# Patient Record
Sex: FEMALE | Race: WHITE | Employment: OTHER | URBAN - METROPOLITAN AREA
[De-identification: names, ages, dates, MRNs, and addresses within clinical notes are randomized per-mention and may not be internally consistent; named-entity substitution may affect disease eponyms.]

---

## 2017-12-01 RX ORDER — HYDROCHLOROTHIAZIDE 25 MG/1
TABLET ORAL
Qty: 90 TABLET | Refills: 3 | Status: SHIPPED | OUTPATIENT
Start: 2017-12-01 | End: 2019-04-04 | Stop reason: SDUPTHER

## 2018-08-13 PROBLEM — M89.9 DISEASE OF SKELETAL SYSTEM: Status: ACTIVE | Noted: 2018-08-13

## 2018-09-10 ENCOUNTER — OFFICE VISIT (OUTPATIENT)
Age: 53
End: 2018-09-10

## 2018-09-10 ENCOUNTER — PROCEDURE VISIT (OUTPATIENT)
Age: 53
End: 2018-09-10

## 2018-09-10 ENCOUNTER — HOSPITAL ENCOUNTER (OUTPATIENT)
Dept: GENERAL RADIOLOGY | Age: 53
Discharge: HOME | End: 2018-09-10
Payer: COMMERCIAL

## 2018-09-10 VITALS
WEIGHT: 131.6 LBS | DIASTOLIC BLOOD PRESSURE: 56 MMHG | SYSTOLIC BLOOD PRESSURE: 93 MMHG | BODY MASS INDEX: 23.32 KG/M2 | HEIGHT: 63 IN

## 2018-09-10 DIAGNOSIS — M89.9 DISEASE OF SKELETAL SYSTEM: ICD-10-CM

## 2018-09-10 DIAGNOSIS — M89.9 DISORDER OF BONE AND CARTILAGE: Primary | ICD-10-CM

## 2018-09-10 DIAGNOSIS — R82.994 HYPERCALCIURIA: ICD-10-CM

## 2018-09-10 DIAGNOSIS — M94.9 DISORDER OF BONE AND CARTILAGE: Primary | ICD-10-CM

## 2018-09-10 DIAGNOSIS — Z51.81 MEDICATION MONITORING ENCOUNTER: ICD-10-CM

## 2018-09-10 DIAGNOSIS — M89.9 DISEASE OF SKELETAL SYSTEM: Primary | ICD-10-CM

## 2018-09-10 LAB — THYROID PEROXIDASE (TPO) ABS: 7 IU/ML

## 2018-09-10 PROCEDURE — 77080 DXA BONE DENSITY AXIAL: CPT

## 2018-09-10 PROCEDURE — 77080 DXA BONE DENSITY AXIAL: CPT | Performed by: INTERNAL MEDICINE

## 2018-09-10 PROCEDURE — 99215 OFFICE O/P EST HI 40 MIN: CPT | Performed by: INTERNAL MEDICINE

## 2018-09-10 RX ORDER — M-VIT,TX,IRON,MINS/CALC/FOLIC 27MG-0.4MG
1 TABLET ORAL DAILY
COMMUNITY

## 2018-09-10 RX ORDER — CHOLECALCIFEROL (VITAMIN D3) 125 MCG
500 CAPSULE ORAL DAILY
COMMUNITY

## 2018-09-10 RX ORDER — LANOLIN ALCOHOL/MO/W.PET/CERES
3 CREAM (GRAM) TOPICAL DAILY
COMMUNITY

## 2018-09-10 NOTE — PROGRESS NOTES
[facility] ([facility]) Osteoporosis and [address] [facility]  Phone [phone]  Fax [phone]    PATIENT NAME: Chidi Chow OF BIRTH: 1965  INITIAL CONSULTATION: 11/24/2004  LAST OFFICE VISIT: 09/07/2016  TODAYS DATE: 09/10/2018    Labs @ [facility] ([facility]) 04/2018    PROBLEMS:   Low bone density by DXA done 05//2001 to evaluate back pain, lowest T-score -1.8 in the spine  Natural menopause age 44  Hypercalciuria, normal 24-h urine calcium for her 100-230 mg/d    398 mg/d 12/2004 on no treatment    220 mg/d 05/2006 with HCTZ 25 mg daily  NEW PROBLEM 2012: Hypothyroidism, Synthroid [id number], stopped on her own. CURRENT MANAGEMENT FOR BONE HEALTH:   Calcium, had been getting 1200 mg/d from diet but changed to vegan diet so may be short    Multivitamin 45 mg Ca 400 IU D, Caltrate 600 mg Ca 800 IU D  Vitamin D    34 ng/mL 06/2014  Exercise, walked 5-6x/wk until foot problems 05/2008; exercise bike 1-2x/wk, lifts weights 3x/wk  Pharmacologic therapy: HCTZ 25 mg daily started 12/2004    PREVIOUS MEDICATIONS FOR OSTEOPOROSIS:   Bone therapy recommended elsewhere in 2004 but not started    OTHER CURRENT MEDICATIONS (SELECTED):  Relafen, Synthroid 50 mcg/d  OTC MEDICATIONS:  None    CHIEF COMPLAINT: Here for followup visit for osteopenia and hypercalciuria, monitoring treatment. No new related signs or symptoms. PAST MEDICAL HISTORY, FAMILY HISTORY, SOCIAL HISTORY AND REVIEW OF SYSTEMS:  Relevant changes since last visit (see patient questionnaire of todays date). SUBJECTIVE:  See problem list for chronic/inactive conditions. She has been taking HCTZ correctly and without side effects. No falls, near falls or fractures. She feels well overall. In 2012 she was diagnosed with hypothyroidism.   After several dosage changes she has been taking Synthroid 50 mcg daily with normal TSH but decided in 2017 to stop Synthroid; since then she has not noted any difference in how she feels. Her  has cardiac problems so they have both been on a vegan diet. NEUROLOGIC EXAM: Able to rise from chair without using arms. No apparent focal motor or sensory deficit. Reflexes brisk and symmetric. Coordination appears normal.  MUSCULOSKELETAL EXAM: Gait: Intact without difficulty. Steady without assistance. Spine: Spinal contours are normal.  No spine tenderness to palpation or percussion. Ribs and pelvis: Ribs appear normal. Two finger spaces between ribs and pelvis. BONE DENSITY:  Most recent done here using Hologic equipment. T-SCORES  Initial study: 05/31/2001 spine L1-L4 -1.8 Lowest hip (left fem. neck) -0.8   Current study: 09/10/2018 spine L1-L4 -2.3 Lowest hip (left fem. neck) -2.0     The table below shows bone mineral density (grams/cm2), the appropriate measure for comparing serial scans An increase or decrease is significant based on precision studies done at our center according to the ISCD protocol. PA spine Proximal Femur (left)   Date L1-L4 Fem. neck Trochanter Total hip   05/31/2001 0.851 0.708 0.784 0.923   07/08/2002 0.833 0.733 0.821 0.926   07/08/2003 0.789 0.739 0.823 0.897   06/25/2004 0.833 0.717 0.816 0.893   06/12/2006 0.834 0.738 0.853 0.966   06/23/2008 0.843 0.736 0.835 0.961   06/16/2010 0.849 0.744 0.877 0.987   06/19/2012 0.856 0.703 0.848 0.927   06/24/2014 0.876 0.686 0.839 0.908   09/07/2016 0.850 0.682 0.796 0.873   09/10/2018 0.791 0.630 0.764 0.832     IMPRESSION:  BONE DENSITY IS BORDERLINE LOW. SINCE THE LAST DXA, BMD DECREASED AT ALL SITES MEASURED, MORE THAN WOULD BE EXPECTED FOR AGE, RACE AND SEX. LABS. 05/2012, Cr 0.8, Ca 9.3 TSH. 05/2014, CBC, CMP. 04/2016 CBC TSH Ca 9.8 Cr 0.8 Na K OK.  04/2018 Ca 10.0 Cr 0.8 TSH 5.04. IMAGING. DXA printouts reviewed. ASSESSMENT:  Premenopausal woman with bone density borderline low, due at least in part to hypercalciuria which is now controlled.

## 2018-09-12 ENCOUNTER — TELEPHONE (OUTPATIENT)
Age: 53
End: 2018-09-12

## 2020-06-29 ENCOUNTER — OFFICE VISIT (OUTPATIENT)
Dept: ENDOCRINOLOGY | Age: 55
End: 2020-06-29
Payer: COMMERCIAL

## 2020-06-29 ENCOUNTER — HOSPITAL ENCOUNTER (OUTPATIENT)
Dept: GENERAL RADIOLOGY | Age: 55
Discharge: HOME | End: 2020-06-29
Payer: COMMERCIAL

## 2020-06-29 ENCOUNTER — PROCEDURE VISIT (OUTPATIENT)
Dept: ENDOCRINOLOGY | Age: 55
End: 2020-06-29

## 2020-06-29 VITALS
WEIGHT: 128 LBS | SYSTOLIC BLOOD PRESSURE: 106 MMHG | DIASTOLIC BLOOD PRESSURE: 71 MMHG | BODY MASS INDEX: 22.68 KG/M2 | HEIGHT: 63 IN

## 2020-06-29 PROCEDURE — 77080 DXA BONE DENSITY AXIAL: CPT | Performed by: INTERNAL MEDICINE

## 2020-06-29 PROCEDURE — 77080 DXA BONE DENSITY AXIAL: CPT

## 2020-06-29 PROCEDURE — 99215 OFFICE O/P EST HI 40 MIN: CPT | Performed by: INTERNAL MEDICINE

## 2020-06-29 RX ORDER — HYDROCHLOROTHIAZIDE 25 MG/1
TABLET ORAL
Qty: 90 TABLET | Refills: 4 | Status: SHIPPED | OUTPATIENT
Start: 2020-06-29 | End: 2021-07-06 | Stop reason: SDUPTHER

## 2020-06-29 RX ORDER — ALENDRONATE SODIUM 70 MG/1
70 TABLET ORAL WEEKLY
Qty: 12 TABLET | Refills: 4 | Status: SHIPPED | OUTPATIENT
Start: 2020-06-29 | End: 2021-07-06 | Stop reason: SDUPTHER

## 2020-06-29 NOTE — PROGRESS NOTES
[facility] ([facility]) Osteoporosis and [address] [facility] [address] [facility]  Phone [phone]  Fax [phone]    PATIENT NAME: Ana Laura Hamlin OF BIRTH: 1965  INITIAL CONSULTATION: 11/24/2004  LAST OFFICE VISIT: 09/10/2018  TODAYS DATE: 06/29/2020    Labs @ [facility] (05/2020    PROBLEMS:   Low bone density by DXA done 05//2001 to evaluate back pain, lowest T-score -1.8 in the spine  Natural menopause age 44  Hypercalciuria, normal 24-h urine calcium for her 100-230 mg/d    398 mg/d 12/2004 on no treatment    220 mg/d 05/2006 with HCTZ 25 mg daily  NEW PROBLEM 2012: Hypothyroidism, Synthroid [id number], stopped on her own    05/2020 TSH 4.8. 09/2018 TPO antibodies (-)     CURRENT MANAGEMENT FOR BONE HEALTH:   Calcium, had been getting 1200 mg/d from diet but changed to vegan diet so may be short    Multivitamin 45 mg Ca 400 IU D, Caltrate 600 mg Ca 800 IU D  Vitamin D    34 ng/mL 06/2014  Exercise, walked 5-6x/wk until foot problems 05/2008; exercise bike 1-2x/wk, lifts weights 3x/wk  Pharmacologic therapy: HCTZ 25 mg daily started 12/2004    PREVIOUS MEDICATIONS FOR OSTEOPOROSIS:   Bone therapy recommended elsewhere in 2004 but not started    OTHER CURRENT MEDICATIONS (SELECTED):  Relafen, Synthroid 50 mcg/d  OTC MEDICATIONS:  None    CHIEF COMPLAINT: Here for followup visit for osteopenia and hypercalciuria, monitoring treatment. No new related signs or symptoms. PAST MEDICAL HISTORY, FAMILY HISTORY, SOCIAL HISTORY AND REVIEW OF SYSTEMS:  Relevant changes since last visit (see patient questionnaire of todays date). INTERVAL HISTORY:    See problem list for chronic/inactive conditions. She has been taking HCTZ correctly and without side effects. No falls, near falls or fractures. She feels well overall. Zee Damico NEUROLOGIC EXAM: Able to rise from chair without using arms. No apparent focal motor or sensory deficit. Reflexes brisk and symmetric.   Coordination appears normal.  MUSCULOSKELETAL EXAM: Gait: Intact without difficulty. Steady without assistance. Spine: Spinal contours are normal.  No spine tenderness to palpation or percussion. Ribs and pelvis: Ribs appear normal. Two finger spaces between ribs and pelvis. BONE DENSITY:  Most recent done here using Hologic equipment. T-SCORES  Initial study: 05/31/2001 spine L1-L4 -1.8 Lowest hip (left fem. neck) -0.8   Current study: 06/29/2020 spine L1-L4 -2.8 Lowest hip (left fem. neck) -2.2     The table below shows bone mineral density (grams/cm2), the appropriate measure for comparing serial scans. An increase or decrease is significant based on precision studies done at our center according to the ISCD protocol. PA spine Proximal Femur (left)   Date L1-L4 Fem. neck Trochanter Total hip   05/31/2001 0.851 0.708 0.784 0.923   07/08/2002 0.833 0.733 0.821 0.926   07/08/2003 0.789 0.739 0.823 0.897   06/25/2004 0.833 0.717 0.816 0.893   06/12/2006 0.834 0.738 0.853 0.966   06/23/2008 0.843 0.736 0.835 0.961   06/16/2010 0.849 0.744 0.877 0.987   06/19/2012 0.856 0.703 0.848 0.927   06/24/2014 0.876 0.686 0.839 0.908   09/07/2016 0.850 0.682 0.796 0.873   09/10/2018 0.791 0.630 0.764 0.832   06/29/2020 0.735 0.599 0.775 0.822     IMPRESSION:  BONE DENSITY IS LOW. SINCE THE LAST DXA, BMD DECREASED IN THE SPINE. BETWEEN 2016 AND 2020, BMD DECREASED IN THE SPINE, FEMORAL NECK AND TOTAL HIP. LABS. 05/2012, Cr 0.8, Ca 9.3 TSH. 05/2014, CBC, CMP. 04/2016 CBC TSH Ca 9.8 Cr 0.8 Na K OK.  04/2018 Ca 10.0 Cr 0.8 TSH 5.04. 05/2020 Ca 9.7 Cr 0.8 TSH 4.8. IMAGING. DXA printouts reviewed. ASSESSMENT:  Premenopausal woman with bone density borderline low, due at least in part to hypercalciuria which is now controlled. BMD has been steadily decreasing since 2014. PLANS:  Continue HCTZ 25 mg/d. We discussed treatment options (alendronate, Reclast, Prolia). I recommended alendronate and she agreed.  Rx sent, dosing instructions reviewed. Lab: 25-OH D. Return appointment with DXA in 1 year. I spent 45 minutes face to face with this patient. Over 50% of that time was spent on counseling and care coordination. See assessment and plan for counseling and care coordination details. Michelle Arroyo MD, Director, [facility]) Osteoporosis and Bone Health Services    CC:  Adrián Hendricks MD, [address] [place], [place]

## 2020-06-29 NOTE — RESULT ENCOUNTER NOTE
[facility]) Osteoporosis and [address] [address] [facility] [address] [place] [address]   Phone [phone]  Fax [phone]    NAME: Maria G Laguerre   : 1965   STUDY DATE: 2020     REFERRING PHYSICIAN: Nicola Halsted MD    INDICATION(S) FOR PERFORMING THE STUDY:  osteoporosis, age related (M81.0)    CLINICAL INFORMATION PROVIDED BY THE PATIENT: 66-year-old woman. She is premenopausal. No history of fragility fractures. No long-term corticosteroid use. She is receiving hydrochlorothiazide for hypercalciuria (started 2004)    EQUIPMENT: Hologic Discovery. POSITIONING: Good. REGIONS OF INTEREST: Correct. ARTIFACTS: None. STUDY VALID? Yes. No adjustments were made. T-SCORES  Initial study: 2001 spine L1-L4 -1.8 Lowest hip (left fem. neck) -0.8   Current study: 2020 spine L1-L4 -2.8 Lowest hip (left fem. neck) -2.2     The table below shows bone mineral density (grams/cm2), the appropriate measure for comparing serial scans. An increase or decrease is significant based on precision studies done at our center according to the ISCD protocol. PA spine Proximal Femur (left)   Date L1-L4 Fem. neck Trochanter Total hip   2001 0.851 0.708 0.784 0.923   2002 0.833 0.733 0.821 0.926   2003 0.789 0.739 0.823 0.897   2004 0.833 0.717 0.816 0.893   2006 0.834 0.738 0.853 0.966   2008 0.843 0.736 0.835 0.961   2010 0.849 0.744 0.877 0.987   2012 0.856 0.703 0.848 0.927   2014 0.876 0.686 0.839 0.908   2016 0.850 0.682 0.796 0.873   09/10/2018 0.791 0.630 0.764 0.832   2020 0.735 0.599 0.775 0.822     IMPRESSION:  BONE DENSITY IS LOW. SINCE THE LAST DXA, BMD DECREASE IN THE SPINE. BETWEEN  AND , BMD DECREASED IN THE SPINE, FEMORAL NECK AND TOTAL HIP. Consider repeating this study in 2-3 years to assess the patient's progress. _________________________________________________   Krystin Fulling

## 2021-06-06 PROBLEM — M81.0 POSTMENOPAUSAL OSTEOPOROSIS: Status: ACTIVE | Noted: 2021-06-06

## 2021-07-06 ENCOUNTER — PROCEDURE VISIT (OUTPATIENT)
Dept: ENDOCRINOLOGY | Age: 56
End: 2021-07-06

## 2021-07-06 ENCOUNTER — HOSPITAL ENCOUNTER (OUTPATIENT)
Dept: GENERAL RADIOLOGY | Age: 56
Discharge: HOME | End: 2021-07-06
Payer: COMMERCIAL

## 2021-07-06 ENCOUNTER — OFFICE VISIT (OUTPATIENT)
Dept: ENDOCRINOLOGY | Age: 56
End: 2021-07-06
Payer: COMMERCIAL

## 2021-07-06 VITALS
BODY MASS INDEX: 22.79 KG/M2 | HEIGHT: 63 IN | SYSTOLIC BLOOD PRESSURE: 105 MMHG | WEIGHT: 128.6 LBS | DIASTOLIC BLOOD PRESSURE: 65 MMHG

## 2021-07-06 DIAGNOSIS — M81.0 POSTMENOPAUSAL OSTEOPOROSIS: ICD-10-CM

## 2021-07-06 DIAGNOSIS — R82.994 HYPERCALCIURIA: ICD-10-CM

## 2021-07-06 DIAGNOSIS — M81.0 POSTMENOPAUSAL OSTEOPOROSIS: Primary | ICD-10-CM

## 2021-07-06 DIAGNOSIS — Z51.81 MEDICATION MONITORING ENCOUNTER: ICD-10-CM

## 2021-07-06 PROCEDURE — 77080 DXA BONE DENSITY AXIAL: CPT | Performed by: INTERNAL MEDICINE

## 2021-07-06 PROCEDURE — 99214 OFFICE O/P EST MOD 30 MIN: CPT | Performed by: INTERNAL MEDICINE

## 2021-07-06 PROCEDURE — 77080 DXA BONE DENSITY AXIAL: CPT

## 2021-07-06 RX ORDER — HYDROCHLOROTHIAZIDE 25 MG/1
TABLET ORAL
Qty: 90 TABLET | Refills: 4 | Status: SHIPPED | OUTPATIENT
Start: 2021-07-06 | End: 2022-07-25 | Stop reason: SDUPTHER

## 2021-07-06 RX ORDER — ALENDRONATE SODIUM 70 MG/1
70 TABLET ORAL WEEKLY
Qty: 12 TABLET | Refills: 4 | Status: SHIPPED | OUTPATIENT
Start: 2021-07-06 | End: 2022-07-25 | Stop reason: SDUPTHER

## 2021-07-06 NOTE — RESULT ENCOUNTER NOTE
[facility] ([facility]) Osteoporosis and [address] [facility] [address]  [address], [address] [place] [address],[place] [address]  Phone [phone]  Fax [phone]    NAME: Hortencia Burnette   : 1965   STUDY DATE: 2021     REFERRING PHYSICIAN: Jonah Mckeon MD    INDICATION(S) FOR PERFORMING THE STUDY:  osteoporosis, age related (M81.0)    CLINICAL INFORMATION PROVIDED BY THE PATIENT: 26-year-old woman. She is premenopausal. No history of fragility fractures. No long-term corticosteroid use. She is receiving hydrochlorothiazide for hypercalciuria (started 2004). Current treatment is alendronate started 2020. EQUIPMENT: Hologic Discovery. POSITIONING: Good. REGIONS OF INTEREST: Correct. ARTIFACTS: None. STUDY VALID? Yes. No adjustments were made. T-SCORES  Initial study: 2001 spine L1-L4 -1.8 Lowest hip (left fem. neck) -0.8   Current study: 2021 spine L1-L4 -2.6 Lowest hip (left fem. neck) -2.0     The table below shows bone mineral density (grams/cm2), the appropriate measure for comparing serial scans. An increase or decrease is significant based on precision studies done at our center according to the ISCD protocol. PA spine Proximal Femur (left)   Date L1-L4 Fem. neck Trochanter Total hip   2001 0.851 0.708 0.784 0.923   2002 0.833 0.733 0.821 0.926   2003 0.789 0.739 0.823 0.897   2004 0.833 0.717 0.816 0.893   2008 0.843 0.736 0.835 0.961   2012 0.856 0.703 0.848 0.927   2016 0.850 0.682 0.796 0.873   09/10/2018 0.791 0.630 0.764 0.832   2020 0.735 0.599 0.775 0.822   2021 0.758 0.625 0.788 0.849     IMPRESSION:  BONE DENSITY IS LOW. SINCE THE LAST DXA, BMD IS TRENDING UP IN THE SPINE,, FEMORAL NECK AND TOTAL HIP. Consider repeating this study in 2-3 years to assess the patient's progress. ________________________________________________[facility] Ti Arroyo MD, Director, [facility]) Osteoporosis and [facility]

## 2021-07-06 NOTE — PROGRESS NOTES
[facility]) Osteoporosis and [address] [facility]  Phone [phone]  Fax [phone]    NAME: Sheila Mcmillan OF BIRTH: 1965  INITIAL CONSULTATION: 11/24/2004  LAST OFFICE VISIT: 06/29/2020  TODAYS DATE: 07/06/2021    Labs @ [facility] 04/2021    PROBLEMS:   Low bone density by DXA done 05//2001 to evaluate back pain, lowest T-score -1.8 in the spine  Natural menopause age 44  Hypercalciuria, normal 24-h urine calcium for her 100-230 mg/d    398 mg/d 12/2004 on no treatment    220 mg/d 05/2006 with HCTZ 25 mg daily    242 mg/d 09/2018 with HCTZ 25 mg/d  NEW PROBLEM 2012: Hypothyroidism, Synthroid [id number], stopped on her own    05/2020 TSH 4.8. 09/2018 TPO antibodies (-)     CURRENT MANAGEMENT FOR BONE HEALTH:   Calcium, had been getting 1200 mg/d from diet but changed to vegan diet so may be short    Multivitamin 45 mg Ca 400 IU D, Caltrate 600 mg Ca 800 IU D  Vitamin D 1000 IU/d with multivitamin    25-OH D 31 ng/mL 04/2021 (desirable 30-60)  Exercise, walked 5-6x/wk until foot problems 05/2008; exercise bike 1-2x/wk, lifts weights 3x/wk  Pharmacologic therapy: HCTZ 25 mg daily started 12/2004    Alendronate 70 mg weekly started 06/2020    PREVIOUS MEDICATIONS FOR OSTEOPOROSIS:   Bone therapy recommended elsewhere in 2004 but not started    OTHER CURRENT MEDICATIONS (SELECTED):  Relafen, Synthroid 50 mcg/d  OTC MEDICATIONS:  None    CHIEF COMPLAINT: Here for followup visit for osteopenia and hypercalciuria, monitoring treatment. No new related signs or symptoms. PAST MEDICAL HISTORY, FAMILY HISTORY, SOCIAL HISTORY AND REVIEW OF SYSTEMS:  Relevant changes since last visit (see patient questionnaire of todays date). INTERVAL HISTORY:    See problem list for chronic/inactive conditions. She has been taking HCTZ and alendronate correctly and without side effects. No falls, near falls or fractures. She feels well overall. Gena [place]       NEUROLOGIC EXAM: Able to rise from chair without using arms. No apparent focal motor or sensory deficit. Reflexes brisk and symmetric. Coordination appears normal.  MUSCULOSKELETAL EXAM: Gait: Intact without difficulty. Steady without assistance. Spine: Spinal contours are normal.  No spine tenderness to palpation or percussion. Ribs and pelvis: Ribs appear normal. Two finger spaces between ribs and pelvis. BONE DENSITY:  Most recent done here using Hologic equipment. T-SCORES  Initial study: 05/31/2001 spine L1-L4 -1.8 Lowest hip (left fem. neck) -0.8   Current study: 07/06/2021 spine L1-L4 -2.6 Lowest hip (left fem. neck) -2.0     The table below shows bone mineral density (grams/cm2), the appropriate measure for comparing serial scans. An increase or decrease is significant based on precision studies done at our center according to the ISCD protocol. PA spine Proximal Femur (left)   Date L1-L4 Fem. neck Trochanter Total hip   05/31/2001 0.851 0.708 0.784 0.923   07/08/2002 0.833 0.733 0.821 0.926   07/08/2003 0.789 0.739 0.823 0.897   06/25/2004 0.833 0.717 0.816 0.893   06/23/2008 0.843 0.736 0.835 0.961   06/19/2012 0.856 0.703 0.848 0.927   09/07/2016 0.850 0.682 0.796 0.873   09/10/2018 0.791 0.630 0.764 0.832   06/29/2020 0.735 0.599 0.775 0.822   07/06/2021 0.758 0.625 0.788 0.849     IMPRESSION:  BONE DENSITY IS LOW. SINCE THE LAST DXA, BMD IS TRENDING UP IN THE SPINE, FEMORAL NECK AND TOTAL HIP. LABS. 05/2012, Cr 0.8, Ca 9.3 TSH. 05/2014, CBC, CMP. 04/2016 CBC TSH Ca 9.8 Cr 0.8 Na K OK.  04/2018 Ca 10.0 Cr 0.8 TSH 5.04. 05/2020 Ca 9.7 Cr 0.8 TSH 4.8. 074239 Ca 9.5 Cr 0.8. IMAGING. DXA printouts reviewed. ASSESSMENT:  Premenopausal woman with bone density borderline low, due at least in part to hypercalciuria which is now controlled. BMD steadily decreased [id number]. She is doing well with alendronate started 06/2020. PLANS:  Continue HCTZ 25 mg/d and alendronate 70 mg weekly. Return appointment with DXA in 1 year. Time spent today: 30-40 minutes. Blaze Arroyo MD, Director, [facility]) Osteoporosis and Bone Health Services    CC:  Susan Longoria MD, [address] [place] [address] [place], [place]

## 2022-07-25 ENCOUNTER — OFFICE VISIT (OUTPATIENT)
Dept: ENDOCRINOLOGY | Age: 57
End: 2022-07-25
Payer: COMMERCIAL

## 2022-07-25 ENCOUNTER — PROCEDURE VISIT (OUTPATIENT)
Dept: ENDOCRINOLOGY | Age: 57
End: 2022-07-25

## 2022-07-25 ENCOUNTER — HOSPITAL ENCOUNTER (OUTPATIENT)
Dept: GENERAL RADIOLOGY | Age: 57
Discharge: HOME | End: 2022-07-25
Payer: COMMERCIAL

## 2022-07-25 VITALS
WEIGHT: 128.2 LBS | SYSTOLIC BLOOD PRESSURE: 108 MMHG | HEIGHT: 63 IN | BODY MASS INDEX: 22.71 KG/M2 | DIASTOLIC BLOOD PRESSURE: 66 MMHG

## 2022-07-25 DIAGNOSIS — M81.0 POSTMENOPAUSAL OSTEOPOROSIS: ICD-10-CM

## 2022-07-25 DIAGNOSIS — R82.994 HYPERCALCIURIA: ICD-10-CM

## 2022-07-25 DIAGNOSIS — M81.0 POSTMENOPAUSAL OSTEOPOROSIS: Primary | ICD-10-CM

## 2022-07-25 DIAGNOSIS — Z51.81 MEDICATION MONITORING ENCOUNTER: ICD-10-CM

## 2022-07-25 PROCEDURE — 99214 OFFICE O/P EST MOD 30 MIN: CPT | Performed by: INTERNAL MEDICINE

## 2022-07-25 PROCEDURE — 77080 DXA BONE DENSITY AXIAL: CPT

## 2022-07-25 PROCEDURE — 77080 DXA BONE DENSITY AXIAL: CPT | Performed by: INTERNAL MEDICINE

## 2022-07-25 RX ORDER — ALENDRONATE SODIUM 70 MG/1
70 TABLET ORAL WEEKLY
Qty: 12 TABLET | Refills: 4 | Status: SHIPPED | OUTPATIENT
Start: 2022-07-25

## 2022-07-25 RX ORDER — HYDROCHLOROTHIAZIDE 25 MG/1
TABLET ORAL
Qty: 90 TABLET | Refills: 4 | Status: SHIPPED | OUTPATIENT
Start: 2022-07-25

## 2022-07-25 NOTE — PROGRESS NOTES
[facility]) Osteoporosis and [address] [facility]  Phone [phone]  Fax [phone]    NAME: Meg Chin OF BIRTH: 1965  INITIAL CONSULTATION: 11/24/2004  LAST OFFICE VISIT: 07/06/2021  TODAY'S DATE: 07/25/2022    Labs @ Dr. Ralf Osorio 04/2022    PROBLEMS:   Low bone density by DXA done 05//2001 to evaluate back pain, lowest T-score -1.8 in the spine  Natural menopause age 44  Hypercalciuria, normal 24-h urine calcium for her 100-230 mg/d    398 mg/d 12/2004 on no treatment    220 mg/d 05/2006 with HCTZ 25 mg daily    242 mg/d 09/2018 with HCTZ 25 mg/d  NEW PROBLEM 2012: Hypothyroidism, Synthroid [id number], stopped on her own    05/2020 TSH 4.8. 09/2018 TPO antibodies (-)     CURRENT MANAGEMENT FOR BONE HEALTH:   Calcium, had been getting 1200 mg/d from diet but changed to vegan diet so may be short    Multivitamin 45 mg Ca 400 IU D, Caltrate 600 mg Ca 800 IU D  Vitamin D 1000 IU/d with multivitamin    25-OH D 31 ng/mL 04/2021 (desirable 30-60)  Exercise, walked 5-6x/wk until foot problems 05/2008; exercise bike 1-2x/wk, lifts weights 3x/wk  Pharmacologic therapy: HCTZ 25 mg daily started 12/2004    Alendronate 70 mg weekly started 06/2020    PREVIOUS MEDICATIONS FOR OSTEOPOROSIS:   Bone therapy recommended elsewhere in 2004 but not started    OTHER CURRENT MEDICATIONS (SELECTED):  Relafen, Synthroid 50 mcg/d  OTC MEDICATIONS:  None    CHIEF COMPLAINT: Here for followup visit for osteopenia and hypercalciuria, monitoring treatment. No new related signs or symptoms. PAST MEDICAL HISTORY, FAMILY HISTORY, SOCIAL HISTORY AND REVIEW OF SYSTEMS:  Relevant changes since last visit (see patient questionnaire of today's date). INTERVAL HISTORY:    See problem list for chronic/inactive conditions. She has been taking HCTZ and alendronate correctly and without side effects. No falls, near falls or fractures. She feels well overall. Cristy Cantu HCTZ 25 mg/d and alendronate 70 mg weekly. Return appointment with DXA in 1 year. Time spent today: 30-39 minutes. Francois Arroyo MD, Director, [facility]) Osteoporosis and Bone Health Services    CC:  Edgar Moreno MD, [address] [place] [address] [place], [place]

## 2022-07-25 NOTE — RESULT ENCOUNTER NOTE
[facility]) Osteoporosis and [address] [facility] [address] [facility], [address] [place] [address],[place] [address]  Phone [phone]  Fax [phone]    NAME: Esau Gonzalez   : 1965   STUDY DATE: 2022     REFERRING PHYSICIAN: Doris Parrish MD    INDICATION(S) FOR PERFORMING THE STUDY:  osteoporosis, age related (M81.0)    CLINICAL INFORMATION PROVIDED BY THE PATIENT: 58-year-old woman. She is premenopausal. No history of fragility fractures. No long-term corticosteroid use. She is receiving hydrochlorothiazide for hypercalciuria (started 2004). Current treatment is alendronate started 2020. EQUIPMENT: Hologic Discovery. POSITIONING: Good. REGIONS OF INTEREST: Correct. ARTIFACTS: None. STUDY VALID? Yes. No adjustments were made. T-SCORES  Initial study: 2001 spine L1-L4 -1.8 Lowest hip (left fem. neck) -0.8   Current study: 2022 spine L1-L4 -2.6 Lowest hip (left fem. neck) -2.0     The table below shows bone mineral density (grams/cm2), the appropriate measure for comparing serial scans. An increase or decrease is significant based on precision studies done at our center according to the ISCD protocol. PA spine Proximal Femur (left)   Date L1-L4 Fem. neck Trochanter Total hip   2001 0.851 0.708 0.784 0.923   2002 0.833 0.733 0.821 0.926   2012 0.856 0.703 0.848 0.927   2016 0.850 0.682 0.796 0.873   09/10/2018 0.791 0.630 0.764 0.832   2020 0.735 0.599 0.775 0.822   2021 0.758 0.625 0.788 0.849   2022 0.767 0.600 0.750 0.819     IMPRESSION:  BONE DENSITY IS LOW. SINCE THE LAST DXA, BMD DID NOT CHANGE SIGNIFICANTLY IN THE SPINE OR LEFT HIP.  COMPARED WITH 2020, BEFORE STARTING ALENDRONATE, BMD IS HIGHER NOW IN THE SPINE. Consider repeating this study in 2-3 years to assess the patient's progress. _________________________________________________   Magen Kaveh Arroyo MD, Director, [facility]) Osteoporosis and [facility]

## 2023-08-28 ENCOUNTER — HOSPITAL ENCOUNTER (OUTPATIENT)
Dept: GENERAL RADIOLOGY | Age: 58
Discharge: HOME | End: 2023-08-28
Payer: COMMERCIAL

## 2023-08-28 ENCOUNTER — OFFICE VISIT (OUTPATIENT)
Dept: ENDOCRINOLOGY | Age: 58
End: 2023-08-28

## 2023-08-28 ENCOUNTER — PROCEDURE VISIT (OUTPATIENT)
Dept: ENDOCRINOLOGY | Age: 58
End: 2023-08-28

## 2023-08-28 VITALS
BODY MASS INDEX: 23.81 KG/M2 | SYSTOLIC BLOOD PRESSURE: 100 MMHG | DIASTOLIC BLOOD PRESSURE: 69 MMHG | HEIGHT: 62 IN | WEIGHT: 129.4 LBS

## 2023-08-28 DIAGNOSIS — M81.0 POSTMENOPAUSAL OSTEOPOROSIS: ICD-10-CM

## 2023-08-28 DIAGNOSIS — Z51.81 MEDICATION MONITORING ENCOUNTER: ICD-10-CM

## 2023-08-28 DIAGNOSIS — M81.0 POSTMENOPAUSAL OSTEOPOROSIS: Primary | ICD-10-CM

## 2023-08-28 DIAGNOSIS — R82.994 HYPERCALCIURIA: ICD-10-CM

## 2023-08-28 PROCEDURE — 77080 DXA BONE DENSITY AXIAL: CPT | Performed by: INTERNAL MEDICINE

## 2023-08-28 PROCEDURE — 77080 DXA BONE DENSITY AXIAL: CPT

## 2023-08-28 PROCEDURE — NBSRV NON-BILLABLE SERVICE: Performed by: INTERNAL MEDICINE

## 2023-08-28 RX ORDER — MELOXICAM 15 MG/1
TABLET ORAL PRN
COMMUNITY
Start: 2023-02-16

## 2023-08-28 RX ORDER — ALENDRONATE SODIUM 70 MG/1
70 TABLET ORAL WEEKLY
Qty: 12 TABLET | Refills: 4 | Status: SHIPPED | OUTPATIENT
Start: 2023-08-28

## 2023-08-28 RX ORDER — LANOLIN ALCOHOL/MO/W.PET/CERES
400 CREAM (GRAM) TOPICAL DAILY
COMMUNITY

## 2023-08-28 NOTE — PROGRESS NOTES
[facility] ([facility]) Osteoporosis and [address] [facility]  Phone [phone]  Fax [phone]    NAME: Para Crystal OF BIRTH: 1965  INITIAL CONSULTATION: 11/24/2004  LAST OFFICE VISIT: 07/25/2022  TODAY'S DATE: 08/28/202    Labs @ Dr. Mindy Cogan 01/2023    PROBLEMS:   Low bone density by DXA done 05//2001 to evaluate back pain, lowest T-score -1.8 in the spine  Natural menopause age 44  Hypercalciuria, normal 24-h urine calcium for her 100-230 mg/d    398 mg/d 12/2004 on no treatment    220 mg/d 05/2006 with HCTZ 25 mg daily    242 mg/d 09/2018 with HCTZ 25 mg/d  NEW PROBLEM 2012: Hypothyroidism, Synthroid [id number], stopped on her own    05/2020 TSH 4.8. 09/2018 TPO antibodies (-)     CURRENT MANAGEMENT FOR BONE HEALTH:   Calcium, had been getting 1200 mg/d from diet but changed to vegan diet so may be short    Multivitamin 45 mg Ca 400 IU D, Caltrate 600 mg Ca 800 IU D  Vitamin D 1000 IU/d with multivitamin    25-OH D 39 ng/mL 01/2023 (desirable 30-60)  Exercise, walked 5-6x/wk until foot problems 05/2008; exercise bike 1-2x/wk, lifts weights 3x/wk  Pharmacologic therapy: HCTZ 25 mg daily started 12/2004    Alendronate 70 mg weekly started 06/2020    PREVIOUS MEDICATIONS FOR OSTEOPOROSIS:   Bone therapy recommended elsewhere in 2004 but not started    OTHER CURRENT MEDICATIONS (SELECTED):  Relafen, Synthroid 50 mcg/d  OTC MEDICATIONS:  None    CHIEF COMPLAINT: Here for followup visit for osteopenia and hypercalciuria, monitoring treatment. No new related signs or symptoms. PAST MEDICAL HISTORY, FAMILY HISTORY, SOCIAL HISTORY AND REVIEW OF SYSTEMS:  Relevant changes since last visit (see patient questionnaire of today's date). INTERVAL HISTORY:    See problem list for chronic/inactive conditions. She has been taking HCTZ and alendronate correctly and without side effects. No falls, near falls or fractures. She feels well overall.

## 2024-04-15 ENCOUNTER — PATIENT MESSAGE (OUTPATIENT)
Dept: ENDOCRINOLOGY | Age: 59
End: 2024-04-15

## 2024-04-15 ENCOUNTER — TELEPHONE (OUTPATIENT)
Dept: ENDOCRINOLOGY | Age: 59
End: 2024-04-15

## 2024-04-15 NOTE — TELEPHONE ENCOUNTER
LVM for patient to see if HCTZ 25 mg should be sent to [facility] in [facility], [address]? My Chart Message sent as well as call.

## 2024-04-15 NOTE — TELEPHONE ENCOUNTER
Pt calling and was requesting refill on her Hydrochlorothiazide. Instructed to pt I did not see that on her current med list and asked her if this was prescribed by another Dr. She states \"no\". Pulled up list of old meds and did see HCTZ listed from Dr Arroyo . So pt is asking if she's suppose to take HCTZ or discontinue?    # [phone]

## 2024-04-16 RX ORDER — HYDROCHLOROTHIAZIDE 25 MG/1
TABLET ORAL
Qty: 90 TABLET | Refills: 4 | Status: SHIPPED | OUTPATIENT
Start: 2024-04-16

## 2024-09-03 ENCOUNTER — OFFICE VISIT (OUTPATIENT)
Dept: ENDOCRINOLOGY | Age: 59
End: 2024-09-03
Payer: COMMERCIAL

## 2024-09-03 ENCOUNTER — HOSPITAL ENCOUNTER (OUTPATIENT)
Dept: GENERAL RADIOLOGY | Age: 59
Discharge: HOME | End: 2024-09-03
Payer: COMMERCIAL

## 2024-09-03 VITALS — HEIGHT: 62 IN | BODY MASS INDEX: 24.11 KG/M2 | WEIGHT: 131 LBS

## 2024-09-03 DIAGNOSIS — Z51.81 MEDICATION MONITORING ENCOUNTER: ICD-10-CM

## 2024-09-03 DIAGNOSIS — R82.994 HYPERCALCIURIA: ICD-10-CM

## 2024-09-03 DIAGNOSIS — M81.0 POSTMENOPAUSAL OSTEOPOROSIS: ICD-10-CM

## 2024-09-03 DIAGNOSIS — M81.0 POSTMENOPAUSAL OSTEOPOROSIS: Primary | ICD-10-CM

## 2024-09-03 PROCEDURE — 77080 DXA BONE DENSITY AXIAL: CPT

## 2024-09-03 PROCEDURE — 99214 OFFICE O/P EST MOD 30 MIN: CPT | Performed by: INTERNAL MEDICINE

## 2024-09-03 RX ORDER — HYDROCHLOROTHIAZIDE 25 MG/1
TABLET ORAL
Qty: 90 TABLET | Refills: 4 | Status: SHIPPED | OUTPATIENT
Start: 2024-09-03

## 2024-09-03 RX ORDER — ALENDRONATE SODIUM 70 MG/1
70 TABLET ORAL WEEKLY
Qty: 12 TABLET | Refills: 4 | Status: SHIPPED | OUTPATIENT
Start: 2024-09-03

## 2024-09-03 NOTE — PROGRESS NOTES
[facility]  [address]  [facility] [address]  Phone [phone]  Fax [phone]    NAME: MIRI WINSTON  YOB: 1965  INITIAL CONSULTATION: 11/24/2004  LAST OFFICE VISIT: 089/28/2023  TODAY'S DATE: 09/03/2024    Labs @ [facility] 07/2024    PROBLEMS:   Low bone density by DXA done 05//2001 to evaluate back pain, lowest T-score -1.8 in the spine  Natural menopause age 50  Hypercalciuria, normal 24-h urine calcium for her 100-230 mg/d    398 mg/d 12/2004 on no treatment    220 mg/d 05/2006 with HCTZ 25 mg daily    242 mg/d 09/2018 with HCTZ 25 mg/d  NEW PROBLEM 2012: Hypothyroidism, Synthroid [id number], stopped on her own    05/2020 TSH 4.8. 09/2018 TPO antibodies (-)     CURRENT MANAGEMENT FOR BONE HEALTH:   Calcium, had been getting 1200 mg/d from diet but changed to vegan diet so may be short    Multivitamin 45 mg Ca 400 IU D, Caltrate 600 mg Ca 800 IU D  Vitamin D 1000 IU/d with multivitamin    25-OH D 40 ng/mL 07/2024 (desirable 30-60)  Exercise, walked 5-6x/wk until foot problems 05/2008; exercise bike 1-2x/wk, lifts weights 3x/wk  Pharmacologic therapy: HCTZ 25 mg daily started 12/2004    Alendronate 70 mg weekly started 06/2020    PREVIOUS MEDICATIONS FOR OSTEOPOROSIS:   Bone therapy recommended elsewhere in 2004 but not started    OTHER CURRENT MEDICATIONS (SELECTED):  Relafen, Synthroid 50 mcg/d  OTC MEDICATIONS:  None    CHIEF COMPLAINT: Here for followup visit for osteopenia and hypercalciuria, monitoring treatment.  No new related signs or symptoms.    PAST MEDICAL HISTORY, FAMILY HISTORY, SOCIAL HISTORYRelevant changes since last visit (see patient questionnaire of today's date).    INTERVAL HISTORY:    See problem list for chronic/inactive conditions.  She has been taking HCTZ and alendronate correctly and without side effects.  No falls, near falls or fractures.  She feels well overall.  Having eyelid surgery soon.    NEUROLOGIC

## 2025-07-07 DIAGNOSIS — M81.0 POSTMENOPAUSAL OSTEOPOROSIS: Primary | ICD-10-CM

## 2025-07-08 RX ORDER — HYDROCHLOROTHIAZIDE 25 MG/1
25 TABLET ORAL DAILY
Qty: 90 TABLET | Refills: 0 | Status: SHIPPED | OUTPATIENT
Start: 2025-07-08

## 2025-08-28 ENCOUNTER — PATIENT MESSAGE (OUTPATIENT)
Dept: ENDOCRINOLOGY | Age: 60
End: 2025-08-28

## 2025-08-28 ENCOUNTER — TELEPHONE (OUTPATIENT)
Dept: ENDOCRINOLOGY | Age: 60
End: 2025-08-28

## 2025-08-28 DIAGNOSIS — M81.0 POSTMENOPAUSAL OSTEOPOROSIS: ICD-10-CM

## 2025-08-29 DIAGNOSIS — M81.0 POSTMENOPAUSAL OSTEOPOROSIS: ICD-10-CM

## 2025-08-29 RX ORDER — HYDROCHLOROTHIAZIDE 25 MG/1
25 TABLET ORAL DAILY
Qty: 90 TABLET | Refills: 0 | Status: CANCELLED | OUTPATIENT
Start: 2025-08-29

## 2025-08-29 RX ORDER — HYDROCHLOROTHIAZIDE 25 MG/1
25 TABLET ORAL DAILY
Qty: 90 TABLET | Refills: 4 | Status: SHIPPED | OUTPATIENT
Start: 2025-08-29

## 2025-09-03 ENCOUNTER — HOSPITAL ENCOUNTER (OUTPATIENT)
Dept: GENERAL RADIOLOGY | Age: 60
Discharge: HOME | End: 2025-09-03
Payer: COMMERCIAL

## 2025-09-03 ENCOUNTER — OFFICE VISIT (OUTPATIENT)
Dept: ENDOCRINOLOGY | Age: 60
End: 2025-09-03
Payer: COMMERCIAL

## 2025-09-03 VITALS — BODY MASS INDEX: 22.78 KG/M2 | WEIGHT: 123.8 LBS | HEIGHT: 62 IN

## 2025-09-03 DIAGNOSIS — M81.0 POSTMENOPAUSAL OSTEOPOROSIS: Primary | ICD-10-CM

## 2025-09-03 DIAGNOSIS — Z51.81 MEDICATION MONITORING ENCOUNTER: ICD-10-CM

## 2025-09-03 DIAGNOSIS — M81.0 POSTMENOPAUSAL OSTEOPOROSIS: ICD-10-CM

## 2025-09-03 DIAGNOSIS — R82.994 HYPERCALCIURIA: ICD-10-CM

## 2025-09-03 PROCEDURE — 77080 DXA BONE DENSITY AXIAL: CPT

## 2025-09-03 PROCEDURE — 77080 DXA BONE DENSITY AXIAL: CPT | Performed by: INTERNAL MEDICINE

## 2025-09-03 PROCEDURE — 99214 OFFICE O/P EST MOD 30 MIN: CPT | Performed by: INTERNAL MEDICINE

## 2025-09-03 RX ORDER — HYDROCHLOROTHIAZIDE 25 MG/1
25 TABLET ORAL DAILY
Qty: 90 TABLET | Refills: 0 | Status: CANCELLED | OUTPATIENT
Start: 2025-09-03

## 2025-09-03 RX ORDER — ALENDRONATE SODIUM 70 MG/1
70 TABLET ORAL WEEKLY
Qty: 12 TABLET | Refills: 4 | Status: SHIPPED | OUTPATIENT
Start: 2025-09-03